# Patient Record
(demographics unavailable — no encounter records)

---

## 2025-05-01 NOTE — CONSULT LETTER
[Dear  ___] : Dear  [unfilled], [Consult Letter:] : I had the pleasure of evaluating your patient, [unfilled]. [Please see my note below.] : Please see my note below. [Consult Closing:] : Thank you very much for allowing me to participate in the care of this patient.  If you have any questions, please do not hesitate to contact me. [Sincerely,] : Sincerely, [FreeTextEntry3] : Christine Palladino, CPNP Department of Pediatric Neurology [facility]  [address], [address] Tel: [phone] Fax: [phone]

## 2025-05-01 NOTE — PHYSICAL EXAM
[Well-appearing] : well-appearing [Normocephalic] : normocephalic [Anterior fontanel- Open] : anterior fontanel- open [Anterior fontanel- Soft] : anterior fontanel- soft [Anterior fontanel- Flat] : anterior fontanel- flat [No dysmorphic facial features] : no dysmorphic facial features [No ocular abnormalities] : no ocular abnormalities [Neck supple] : neck supple [No abnormal neurocutaneous stigmata or skin lesions] : no abnormal neurocutaneous stigmata or skin lesions [Straight] : straight [No jose or dimples] : no jose or dimples [No deformities] : no deformities [Alert] : alert [Regards] : regards [Smiling] : smiling [Cooing] : cooing [Pupils reactive to light] : pupils reactive to light [Turns to light] : turns to light [Tracks face, light or objects with full extraocular movements] : tracks face, light or objects with full extraocular movements [No facial asymmetry or weakness] : no facial asymmetry or weakness [No nystagmus] : no nystagmus [Responds to voice/sounds] : responds to voice/sounds [Midline tongue] : midline tongue [No fasciculations] : no fasciculations [Normal axial and appendicular muscle tone with symmetric limb movements] : normal axial and appendicular muscle tone with symmetric limb movements [Normal bulk] : normal bulk [Reaches for toys] : reaches for toys [Good  bilaterally] : good  bilaterally [Lift head in prone] : lift head in prone [Roll over] : roll over [No abnormal involuntary movements] : no abnormal involuntary movements [2+ biceps] : 2+ biceps [Knee jerks] : knee jerks [Ankle jerks] : ankle jerks [No ankle clonus] : no ankle clonus [Responds to touch and tickle] : responds to touch and tickle [de-identified] : Assistance with sitting

## 2025-05-01 NOTE — DEVELOPMENTAL MILESTONES
[Normal] : Developmental history within normal limits [Feeds self] : feeds self [Uses verbal exploration] : uses verbal exploration [Uses oral exploration] : uses oral exploration [Beginning to recognize own name] : beginning to recognize own name [Enjoys vocal turn taking] : enjoys vocal turn taking [Shows pleasure from interactions with others] : shows pleasure from interactions with others [Passes objects] : passes objects [Rakes objects] : rakes objects [Rudolph] : rudolph [Combines syllables] : combines syllables [Manish/Mama non-specific] : manish/mama non-specific [Imitate speech/sounds] : imitate speech/sounds [Single syllables (ah,eh,oh)] : single syllables (ah,eh,oh) [Spontaneous Excessive Babbling] : spontaneous excessive babbling [Turns to voices] : turns to voices [Sit - no support, leaning forward] : sit - no support, leaning forward [Pulls to sit - no head lag] : pulls to sit - no head lag [Roll over] : roll over

## 2025-05-01 NOTE — HISTORY OF PRESENT ILLNESS
[FreeTextEntry1] : 6mo old twin ex 26 weeker here for initial consultation of developmental delay. Sent by PCP for concerns of head lag. Born at 36.2 weeks via . She did not spend anytime in the NICU, went home on day 2 of life  Early milestones: Rolled at 3 months of age (both ways) Lifts her head in tummy time Tracks with her eyes Smiles Peach and babbles Sits in tripod or with assistance  Otherwise, no health concerns, eating well, sleeping well and gaining weight.  No family history of ASD, developmental delays

## 2025-05-01 NOTE — ASSESSMENT
[FreeTextEntry1] : 6mo old twin ex 26 weeker here for initial consultation of developmental delay. Exam remarkable for truncal hypotonia.

## 2025-05-01 NOTE — END OF VISIT
[Time Spent: ___ minutes] : I have spent [unfilled] minutes of time on the encounter which excludes teaching and separately reported services. [FreeTextEntry3] : I, Dr. Minaya, personally performed the evaluation and management (E/M) services for this new patient.? That E/M includes conducting the clinically appropriate initial history &/or exam, assessing all conditions, and establishing the plan of care.? Today, my JESSE, Casi Palladino, was here to observe my evaluation and management service for this patient & follow plan of care established by me going forward.

## 2025-05-01 NOTE — QUALITY MEASURES
[Referral for Vision] : Referral for Vision: Not Applicable [Referral for Hearing Evaluation] : Referral for Hearing Evaluation: Not Applicable [MRI Brain] : MRI Brain: Not Applicable [Microarray] : Microarray: Not Applicable [Molecular testing for Fragile X] : Molecular testing for Fragile X: Not Applicable [Labs for inborn error of metabolism] : Labs for inborn error of metabolism: Not Applicable [Lead screening] : Lead screening: Not Applicable

## 2025-05-01 NOTE — PLAN
[FreeTextEntry1] : Referral to [facility] for physical therapy evaluation Continue to monitor milestones  Follow up 3 months

## 2025-05-01 NOTE — BIRTH HISTORY
[At ___ Weeks Gestation] : at [unfilled] weeks gestation [[place] [ Section] : by  section [Multiple Gestation] : multiple gestation

## 2025-07-14 NOTE — HISTORY OF PRESENT ILLNESS
[FreeTextEntry1] : 8mo old twin ex 26 weeker here for follow up consultation of developmental delay.  Recommendations at last visit: Referral to [facility] for physical therapy evaluation Continue to monitor milestones  Follow up 3 months  Interval hx:    ------------------------------------------------------------------------------ Chart review: Sent by PCP for concerns of head lag. Born at 36.2 weeks via . She did not spend anytime in the NICU, went home on day 2 of life  Early milestones: Rolled at 3 months of age (both ways) Lifts her head in tummy time Tracks with her eyes Smiles [place] and babbles Sits in tripod or with assistance  Otherwise, no health concerns, eating well, sleeping well and gaining weight.  No family history of ASD, developmental delays